# Patient Record
(demographics unavailable — no encounter records)

---

## 2024-10-08 NOTE — ASSESSMENT
[FreeTextEntry1] :  The patient is being seen today for an injury to her right hand fingers. It occurred after a metal OR door slammed on her hand at work 3 weeks ago. She has been using her hand. She has pain when making certain movements with her hand. She feels like she has no strength in the hand. She says it feels like something is loose in her hand. She has a pain that will shoot up her forearm occasionally.  She still has bumps in the area.  She had a lot of bruising which has improved but she still has a bit of bruising.  She has difficulty using her hand to the full extent.  She has difficulty with anything that is strenuous.  Right hand: No significant deformity seen with mild swelling of the fingers, palpable lumps over proximal phalanges of the index and long and ring fingers, full passive range of motion of all the fingers, decreased active motion especially at the MP joints, no extensor lag, pain with extension of the fingers, neurovascular intact  Patient had a crush injury to the hand which takes a while to improve.  We discussed she had a bad contusion to the bone and bleeding that will take time to heal. I recommend therapy to help her move the healing process along as I believe she is guarding the fingers secondary to pain. We discussed that her tendons did not rupture with the proof being her ability to move her fingers normally. We discussed that if there were to be a tear the tendons in the wrist generally tear and there is little chance of residual nerve damage with this type of injury. I recommend stretching at home in addition to therapy as well as placing her hand in warm water and squeezing a sponge to increase motion.  I demonstrated the stretches for her.  We discussed that she could ice the hand down after placing it in warm water as the heat helps movement and the cold will help the inflammation. I recommend she take a nsaid to reduce pain and inflammation. I will send a prescription for ibuprofen 800.  She will start therapy once it is authorized and follow up 3 weeks after she starts therapy for evaluation.

## 2024-10-08 NOTE — WORK
[Crush Injury] : crush injury [Was the competent medical cause of the injury] : was the competent medical cause of the injury [Are consistent with the injury] : are consistent with the injury [Consistent with my objective findings] : consistent with my objective findings [Total (100%)] : total (100%) [Does not reveal pre-existing condition(s) that may affect treatment/prognosis] : does not reveal pre-existing condition(s) that may affect treatment/prognosis [Cannot return to work because ________] : cannot return to work because [unfilled] [I provided the services listed above] :  I provided the services listed above. [FreeTextEntry1] : fair [FreeTextEntry3] : lg

## 2024-11-19 NOTE — ASSESSMENT
[FreeTextEntry1] :  The patient is being seen today for a follow up on a crush injury of the right hand. She is currently not working. She has started OT and has been working hard on regaining her strength. She generally needs to take an additional Motrin afterwards for soreness. She has specific difficulty with pinching and twisting motions. She is able to perform some activities of daily living but still is brushing her teeth with her left hand.  She has a prescription for ibuprofen and takes it but does not necessarily take it around-the-clock.  Right hand: No significant deformity seen with mild swelling of the fingers, palpable lumps over proximal phalanges of the index and long and ring fingers, full passive range of motion of all the fingers, decreased active motion especially at the MP joints, no extensor lag, pain with extension of the fingers, neurovascular intact  Patient had a crush injury to the hand which takes a while to improve.  I recommend taking ibuprofen on a regular basis to reduce inflammation and pain, especially the pain after she attends OT sessions. We discussed if she does not improve following such a regimen we may change the treatment course. She will continue therapy and begin taking the ibuprofen on a regular basis.  If she is not better when she returns at her next visit we will consider an MRI.  She will follow up in 1 month for evaluation.

## 2025-01-07 NOTE — ASSESSMENT
[FreeTextEntry1] :  The patient is being seen today for a follow up on a crush injury of the right hand. She has seen benefit to her pain and swelling taking Motrin around the clock. She has pain when spreading her fingers and making twisting motions with her wrist as well as stiffness. She is in therapy at [facility] with Hany who has been working with her too improve the stiffness.   Right hand: lumps over proximal phalanges of the index and long and ring fingers have mostly resolved, full passive range of motion of all the fingers, improved range of motion of the fingers and able to touch the fingertips to the palm, neurovascular intact  Patient had a crush injury to the hand which takes a while to improve. We discussed she is still healing as expected at this time.  She still having significant pain in the hand with certain motions which have not improved with therapy.  I am recommending an MRI for evaluation.  While do not believe it is going to change how we treat her, it may give us more information in terms of timing.  I am also concerned that she has pain around the MP joints and would like more information. She will follow up in 6 weeks for evaluation or after he MRI.

## 2025-02-18 NOTE — ASSESSMENT
[FreeTextEntry1] :  The patient is being seen today for a follow up on a crush injury of the right hand. She is in therapy at [facility] with Hany who has been working with her with benefit to her strength. He main issue is her stiffness still. She still has discomfort with certain pressures on her hand. She is not at work.   Right hand: lumps over proximal phalanges of the index and long and ring fingers have mostly resolved, full passive range of motion of all the fingers, improved range of motion of the fingers and able to touch the fingertips to the palm, neurovascular intact   MRI results show Trace tenosynovitis of the first through third flexor tendons at the level of the MCP joints. Mild degenerative changes of the thumb MCP joint.  Patient had a crush injury to the hand which takes a while to improve. We discussed she is still healing as expected at this time.  We discussed that as long as she is improving, she will continue in occupational therapy. We discussed a change in treatment is only necessary if she stops seeing benefit. We will provide an occupational therapy prescription today and send for more approval. She will follow up in 6 weeks for reevaluation.

## 2025-04-01 NOTE — ASSESSMENT
[FreeTextEntry1] :  The patient is being seen today for a follow up on a crush injury of the right hand.  She still has pain with certain motions, mainly twisting. This pain is improving overall she has been doing well in therapy at [facility]. She has been taking OTC nsaids with benefit. She is not in work still. She is a nurse in labor and delivery.   Right hand: ttp over proximal phalanges of the index and long and ring fingers, full passive range of motion of all the fingers, improved range of motion of the fingers and able to touch the fingertips to the palm, neurovascular intact  Patient had a crush injury to the hand which takes a while to improve. We discussed that injections are not an option at this time. We discussed that as long as she is improving, she will continue in occupational therapy. We discussed a change in treatment is only necessary if she stops seeing benefit. I recommend she speak with her nursing supervisor regarding the option of modified duty at work. She will follow up in 6 weeks for reevaluation.

## 2025-05-13 NOTE — ASSESSMENT
[FreeTextEntry1] :  The patient is being seen today for a follow up on a crush injury of the right hand.  She saw great benefit from OT; she had her last appointment yesterday. She was doing exercises at home as well.   Right hand: nonttp over proximal phalanges of the index and long and ring fingers, full passive range of motion of all the fingers, improved range of motion of the fingers and able to touch the fingertips to the palm, neurovascular intact  Patient had a crush injury to the hand. She is doing very well at this point. We will provide a note for her to return to work on full duty starting 5/15/25. I recommend she continue at home exercises, she agrees to do so. She will follow up as needed.

## 2025-05-13 NOTE — WORK
[Does not reveal pre-existing condition(s) that may affect treatment/prognosis] : does not reveal pre-existing condition(s) that may affect treatment/prognosis [I provided the services listed above] :  I provided the services listed above. [Mild Partial] : mild partial [Can return to work without limitations on ______] : can return to work without limitations on [unfilled] [FreeTextEntry1] : fair [FreeTextEntry3] : lg

## 2025-07-16 NOTE — ASSESSMENT
[FreeTextEntry1] : The patient is here for a right hand WLU. She is right hand dominant.   right hand: thumb:70 mp joint, 80 ip joint  index finger: 85 mp joint, 105 pip joint, 85 dip joint  long finger: 90 mp joint, 105 pip joint, 90 dip joint  ring finger: 90 mp joint,105 pip joint, 90 dip joint  pinky finger: 90 mp joint, 110 pip joint, 90 dip joint  dynamometer 60 lbs of force/ 58 lbs of force/ 45 lbs of force   left hand: thumb: 70 mp joint, 80 ip joint  index finger: 110 mp joint, 110 pip joint, 90 dip joint  long finger: 110 mp joint, 105 pip joint, 90 dip joint  ring finger: 105 mp joint, 110 pip joint, 90 dip joint  pinky finger: 110 mp joint, 110 pip joint, 90 dip joint  dynamometer  60 lbs of force/ 50 lbs of force/ 45 lbs of force